# Patient Record
Sex: FEMALE | Race: WHITE
[De-identification: names, ages, dates, MRNs, and addresses within clinical notes are randomized per-mention and may not be internally consistent; named-entity substitution may affect disease eponyms.]

---

## 2021-08-24 ENCOUNTER — HOSPITAL ENCOUNTER (EMERGENCY)
Dept: HOSPITAL 54 - ER | Age: 61
Discharge: HOME | End: 2021-08-24
Payer: COMMERCIAL

## 2021-08-24 VITALS — WEIGHT: 190 LBS | BODY MASS INDEX: 32.44 KG/M2 | HEIGHT: 64 IN

## 2021-08-24 VITALS — SYSTOLIC BLOOD PRESSURE: 174 MMHG | DIASTOLIC BLOOD PRESSURE: 79 MMHG
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
## 2021-08-24 DIAGNOSIS — I10: ICD-10-CM

## 2021-08-24 DIAGNOSIS — Z88.5: ICD-10-CM

## 2021-08-24 DIAGNOSIS — S92.425A: Primary | ICD-10-CM

## 2021-08-24 DIAGNOSIS — F31.9: ICD-10-CM
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
## 2023-05-06 ENCOUNTER — HOSPITAL ENCOUNTER (EMERGENCY)
Dept: HOSPITAL 12 - ER | Age: 63
Discharge: HOME | End: 2023-05-06
Payer: MEDICARE

## 2023-05-06 VITALS — DIASTOLIC BLOOD PRESSURE: 70 MMHG | SYSTOLIC BLOOD PRESSURE: 126 MMHG

## 2023-05-06 VITALS — BODY MASS INDEX: 29.88 KG/M2 | WEIGHT: 175 LBS | HEIGHT: 64 IN

## 2023-05-06 DIAGNOSIS — S01.01XA: ICD-10-CM
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
## 2023-05-06 DIAGNOSIS — S06.0X0A: Primary | ICD-10-CM
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
## 2023-05-06 PROCEDURE — A4663 DIALYSIS BLOOD PRESSURE CUFF: HCPCS

## 2023-05-06 PROCEDURE — A9150 MISC/EXPER NON-PRESCRIPT DRU: HCPCS

## 2023-05-06 NOTE — NUR
Patient discharged to home by Dr Knight in stable condition with brisk steady 
gait.  Written and verbal after care instructions given. Patient verbalized 
understanding and compliance of instructions. Stressed follow up with primary 
doctor and neurologist or return to ER for worsening s/s.